# Patient Record
Sex: FEMALE | Race: WHITE | Employment: FULL TIME | URBAN - METROPOLITAN AREA
[De-identification: names, ages, dates, MRNs, and addresses within clinical notes are randomized per-mention and may not be internally consistent; named-entity substitution may affect disease eponyms.]

---

## 2020-10-24 ENCOUNTER — APPOINTMENT (OUTPATIENT)
Dept: MRI IMAGING | Facility: HOSPITAL | Age: 40
DRG: 776 | End: 2020-10-24
Attending: EMERGENCY MEDICINE
Payer: COMMERCIAL

## 2020-10-24 ENCOUNTER — HOSPITAL ENCOUNTER (INPATIENT)
Facility: HOSPITAL | Age: 40
LOS: 2 days | Discharge: HOME | DRG: 776 | End: 2020-10-27
Attending: EMERGENCY MEDICINE | Admitting: HOSPITALIST
Payer: COMMERCIAL

## 2020-10-24 DIAGNOSIS — I60.9 SAH (SUBARACHNOID HEMORRHAGE) (HCC): ICD-10-CM

## 2020-10-24 DIAGNOSIS — I63.89: Primary | ICD-10-CM

## 2020-10-24 PROCEDURE — 70552 MRI BRAIN STEM W/DYE: CPT | Performed by: EMERGENCY MEDICINE

## 2020-10-24 PROCEDURE — 70553 MRI BRAIN STEM W/O & W/DYE: CPT | Performed by: EMERGENCY MEDICINE

## 2020-10-24 PROCEDURE — 70551 MRI BRAIN STEM W/O DYE: CPT | Performed by: EMERGENCY MEDICINE

## 2020-10-24 NOTE — ED INITIAL ASSESSMENT (HPI)
Pt had left arm numbness and hour ago. It subsided then moved to hand. Pt is symptom free right now. Pt notes that she becomes anxious for BP. Pt noted that her daughter  in the room next door last year.

## 2020-10-24 NOTE — ED PROVIDER NOTES
Patient Seen in: [facility] Emergency Department      History   Patient presents with:  Numbness Weakness    Stated Complaint: pt had left sidded numbness that has subsided    HPI    62-year-old female presents reporting an episode of tingling to th 172.7 cm (5' 8\")   Wt 77.1 kg   SpO2 98%   BMI 25.85 kg/m²         Physical Exam    General:  Vitals as listed. No acute distress   HEENT: Sclerae anicteric. Conjunctivae show no pallor.   Oropharynx clear, mucous membranes moist   Neck: supple, no rigid COMPARISON:  None. INDICATIONS:  pt had left sided numbness that has subsided  TECHNIQUE:  MRI of the brain was performed with multi-planar T1, T2-weighted images with FLAIR sequences and diffusion weighted images without infusion.   PATIENT STATED HISTORY Dictated by (CST): Christiana Wetzel MD on 10/24/2020 at 5:11 PM     Finalized by (CST): Christiana Wetzel MD on 10/24/2020 at 5:47 PM         Critical care time:  A total of 39 minutes of critical care time (exclusive of billable procedures) was adminis pm    Follow-up:  No follow-up provider specified.         Medications Prescribed:  Current Discharge Medication List                       Present on Admission           ICD-10-CM Noted POA    Cerebral venous infarction, acute ([address] I63.89 10/24/2020 Mayrao

## 2020-10-25 ENCOUNTER — APPOINTMENT (OUTPATIENT)
Dept: MRI IMAGING | Facility: HOSPITAL | Age: 40
DRG: 776 | End: 2020-10-25
Attending: RADIOLOGY
Payer: COMMERCIAL

## 2020-10-25 ENCOUNTER — APPOINTMENT (OUTPATIENT)
Dept: CT IMAGING | Facility: HOSPITAL | Age: 40
DRG: 776 | End: 2020-10-25
Attending: NURSE PRACTITIONER
Payer: COMMERCIAL

## 2020-10-25 PROBLEM — I60.9 SAH (SUBARACHNOID HEMORRHAGE) (HCC): Status: ACTIVE | Noted: 2020-10-25

## 2020-10-25 PROBLEM — I63.89: Status: RESOLVED | Noted: 2020-10-24 | Resolved: 2020-10-25

## 2020-10-25 PROCEDURE — 99291 CRITICAL CARE FIRST HOUR: CPT | Performed by: INTERNAL MEDICINE

## 2020-10-25 PROCEDURE — 70553 MRI BRAIN STEM W/O & W/DYE: CPT | Performed by: RADIOLOGY

## 2020-10-25 PROCEDURE — 70496 CT ANGIOGRAPHY HEAD: CPT | Performed by: NURSE PRACTITIONER

## 2020-10-25 PROCEDURE — 70450 CT HEAD/BRAIN W/O DYE: CPT | Performed by: NURSE PRACTITIONER

## 2020-10-25 PROCEDURE — 70546 MR ANGIOGRAPH HEAD W/O&W/DYE: CPT | Performed by: RADIOLOGY

## 2020-10-25 PROCEDURE — 99292 CRITICAL CARE ADDL 30 MIN: CPT | Performed by: INTERNAL MEDICINE

## 2020-10-25 PROCEDURE — 99223 1ST HOSP IP/OBS HIGH 75: CPT | Performed by: HOSPITALIST

## 2020-10-25 PROCEDURE — 99221 1ST HOSP IP/OBS SF/LOW 40: CPT | Performed by: NEUROLOGICAL SURGERY

## 2020-10-25 PROCEDURE — 70544 MR ANGIOGRAPHY HEAD W/O DYE: CPT | Performed by: RADIOLOGY

## 2020-10-25 RX ORDER — ACETAMINOPHEN 650 MG/1
650 SUPPOSITORY RECTAL EVERY 4 HOURS PRN
Status: DISCONTINUED | OUTPATIENT
Start: 2020-10-25 | End: 2020-10-27

## 2020-10-25 RX ORDER — POTASSIUM CHLORIDE 20 MEQ/1
40 TABLET, EXTENDED RELEASE ORAL EVERY 4 HOURS
Status: COMPLETED | OUTPATIENT
Start: 2020-10-25 | End: 2020-10-25

## 2020-10-25 RX ORDER — DOCUSATE SODIUM 100 MG/1
100 CAPSULE, LIQUID FILLED ORAL 2 TIMES DAILY PRN
Status: DISCONTINUED | OUTPATIENT
Start: 2020-10-25 | End: 2020-10-27

## 2020-10-25 RX ORDER — HYDRALAZINE HYDROCHLORIDE 20 MG/ML
10 INJECTION INTRAMUSCULAR; INTRAVENOUS EVERY 2 HOUR PRN
Status: DISCONTINUED | OUTPATIENT
Start: 2020-10-25 | End: 2020-10-27

## 2020-10-25 RX ORDER — ONDANSETRON 2 MG/ML
4 INJECTION INTRAMUSCULAR; INTRAVENOUS EVERY 6 HOURS PRN
Status: DISCONTINUED | OUTPATIENT
Start: 2020-10-25 | End: 2020-10-27

## 2020-10-25 RX ORDER — ACETAMINOPHEN 325 MG/1
650 TABLET ORAL EVERY 4 HOURS PRN
Status: DISCONTINUED | OUTPATIENT
Start: 2020-10-25 | End: 2020-10-27

## 2020-10-25 RX ORDER — PHENYLEPHRINE HCL IN 0.9% NACL 50MG/250ML
PLASTIC BAG, INJECTION (ML) INTRAVENOUS CONTINUOUS PRN
Status: DISCONTINUED | OUTPATIENT
Start: 2020-10-25 | End: 2020-10-27

## 2020-10-25 RX ORDER — LABETALOL HYDROCHLORIDE 5 MG/ML
10 INJECTION, SOLUTION INTRAVENOUS EVERY 10 MIN PRN
Status: DISCONTINUED | OUTPATIENT
Start: 2020-10-25 | End: 2020-10-27

## 2020-10-25 RX ORDER — METOCLOPRAMIDE HYDROCHLORIDE 5 MG/ML
10 INJECTION INTRAMUSCULAR; INTRAVENOUS EVERY 8 HOURS PRN
Status: DISCONTINUED | OUTPATIENT
Start: 2020-10-25 | End: 2020-10-27

## 2020-10-25 RX ORDER — SODIUM CHLORIDE 9 MG/ML
INJECTION, SOLUTION INTRAVENOUS CONTINUOUS
Status: DISCONTINUED | OUTPATIENT
Start: 2020-10-25 | End: 2020-10-26

## 2020-10-25 RX ORDER — MORPHINE SULFATE 2 MG/ML
2 INJECTION, SOLUTION INTRAMUSCULAR; INTRAVENOUS EVERY 2 HOUR PRN
Status: DISCONTINUED | OUTPATIENT
Start: 2020-10-25 | End: 2020-10-27

## 2020-10-25 RX ORDER — LORAZEPAM 2 MG/ML
1 INJECTION INTRAMUSCULAR
Status: DISCONTINUED | OUTPATIENT
Start: 2020-10-25 | End: 2020-10-27

## 2020-10-25 RX ORDER — MORPHINE SULFATE 2 MG/ML
1 INJECTION, SOLUTION INTRAMUSCULAR; INTRAVENOUS EVERY 2 HOUR PRN
Status: DISCONTINUED | OUTPATIENT
Start: 2020-10-25 | End: 2020-10-27

## 2020-10-25 NOTE — H&P
[facility]  History and Physical     Jesse Óscar Patient Status:  Inpatient    1980 MRN [id number]   [facility] 6NE-A Attending Merline Hartshorn [address] Day # 0 PCP None Pcp     Chief Complaint: left sided numbness daily, Disp: 60, Rfl: 6        Review of Systems:   A comprehensive 14 point review of systems was completed. Pertinent positives and negatives noted in the HPI.     Physical Exam:    /78   Pulse 62   Temp 98.2 °F (36.8 °C) (Temporal)   Resp 15   H Possible UTI with 11-20 WBCs and moderate leukocyte esterase. We will continue IV antibiotics with urine culture pending.   Quality:  · DVT Prophylaxis: SCD's  · CODE status: Full  · Begum: N/A  · If COVID testing is negative, may discontinue isolation: Lakisha Layla

## 2020-10-25 NOTE — PHYSICAL THERAPY NOTE
Physical therapy consult ordered via stroke protocol, but pt remains on bedrest at this time. Will initiate evaluation as activity restrictions are lifted.

## 2020-10-25 NOTE — PROGRESS NOTES
[facility]  Neurocritical Care APRN Progress Note    NAME: Dulce Maria Edmonds - ROOM: 4138 - MRN: [id number] - Age: 47year old - [facility]:[birth year]    History Of Present Illness:  Dulce Maria Edmonds is a 47year old female who has a vaginal deliver Extremities normal, atraumatic, no cyanosis or edema, capillary refill <3 sec. Pulses: 2+ and symmetric all extremities  Skin: Skin color, texture, turgor normal for ethnicity, no rashes or lesions, warm and dry  Neurologic:  This patient is alert and or CO2 29.0 10/24/2020    GLU 88 10/24/2020    CA 8.9 10/24/2020    ALB 3.6 10/24/2020    ALKPHO 129 10/24/2020    BILT 0.3 10/24/2020    TP 7.8 10/24/2020    AST 18 10/24/2020    ALT 24 10/24/2020       Assessment/Plan:  1.  ICH, SAH - right frontal pariatal

## 2020-10-25 NOTE — PLAN OF CARE
Patient care assumed [id number] from ED. Neurologically intact. Fluids running. NPO sips with meds. Slight headache managed with tylenol. Plan for scan in am and possibly cerebral angio.

## 2020-10-25 NOTE — ED NOTES
Talked with radiologist dr Darlene Guzman. Confirmed MRI results included MRI with and without contrast. APN aware.  Dr Travis Asif made aware of result included both MRI with and without contrast.

## 2020-10-25 NOTE — CONSULTS
[facility]  Interventional Neuroradiology  Consultation Note    Stephanie Daily Patient Status:  Inpatient    1980 MRN [id number]   [facility] 6NE-A Attending Luis Rolon [address] Day # 0 PCP None Pcp     REASON FOR CON she does not use drugs. ALLERGIES:  No Known Allergies    MEDICATIONS:  Prior to Admission medications :  Medication PRENATAL VITAMIN OR, Sig daily with dinner, Start Date , End Date , Taking?  Yes, Authorizing Provider External/Patient, Reported    Medi distress. HEENT:  Normocephalic, atraumatic  LUNGS: Clear to auscultation bilaterally. HEART:  S1, S2, Regular rate and rhythm. ABD: Soft, non tender  Pedal pulses 1+ bilaterally     NEUROLOGICAL:    Mental status: Alert.  oriented to person, place and extra-axial blood measuring 3.8 cm in AP dimension, 0.8 cm craniocaudad dimension by 1 cm transverse dimension over the high convexity of the posterior right frontal anterior right parietal lobe.   There is some high   T1 and FLAIR signal within the sulci o

## 2020-10-25 NOTE — SLP NOTE
B/S swallow eval ordered per stroke protocol. Pt currently NPO for cerebral angio. RN reported pt tolerated meds with small sips water with no csa. SLP will attempt later today as schedule permits. Lala Moncada M.S.,CCC-SLP  Speech Language Patho

## 2020-10-25 NOTE — CONSULTS
[facility]  Neurocritical Care Consult Note    Curt Carranza Patient Status:  Inpatient    1980 MRN [id number]   [facility] 6NE-A Attending Micaela Chapman, DO   Hosp Day # 0 PCP None Pcp       Reason for Consultatio mg, Oral, BID PRN    •  ondansetron HCl (ZOFRAN) injection 4 mg, 4 mg, Intravenous, Q6H PRN    Or    •  Metoclopramide HCl (REGLAN) injection 10 mg, 10 mg, Intravenous, Q8H PRN    •  LORazepam (ATIVAN) injection 1 mg, 1 mg, Intravenous, Q15 Min PRN    •  m round and reactive to light, extraocular muscles intact, face symmetric, visual fields full  · Motor- 5/5 throughout  · Sens-  Intact to light touch    Diagnostics:   Mri Brain (cpt=70551)    Result Date: 10/24/2020  CONCLUSION:  There is a collection of e includes cerebral venous thrombosis (given postpartum state) resulting in venous infarct and hemorrhage vs vascular malformation vs underlying mass/infection vs trauma.  Will cont neurochecks/pt/ot, consult PONCHO and NS, will check MRV today and plan for gaurav

## 2020-10-25 NOTE — CONSULTS
[facility]  Neurosurgery Consult  10/25/2020    Divina Guevara Patient Status:  Inpatient    1980 MRN [id number]   [facility] 6NE-A Attending Angel Perez DO   Hosp Day # 0 PCP None Pcp     REASON FOR CONSULT:    Sah/possible ve delineate the cause of her blood  She is at her neurologic baseline  We will follow    Nicholas Wright MD   [address]  10/25/2020  10:19 AM

## 2020-10-25 NOTE — PLAN OF CARE
Problem: NEUROLOGICAL - ADULT  Goal: Achieves stable or improved neurological status  Description: INTERVENTIONS  - Assess for and report changes in neurological status  - Initiate measures to prevent increased intracranial pressure  - Maintain blood pre

## 2020-10-26 ENCOUNTER — APPOINTMENT (OUTPATIENT)
Dept: INTERVENTIONAL RADIOLOGY/VASCULAR | Facility: HOSPITAL | Age: 40
DRG: 776 | End: 2020-10-26
Attending: RADIOLOGY
Payer: COMMERCIAL

## 2020-10-26 ENCOUNTER — APPOINTMENT (OUTPATIENT)
Dept: CT IMAGING | Facility: HOSPITAL | Age: 40
DRG: 776 | End: 2020-10-26
Attending: NURSE PRACTITIONER
Payer: COMMERCIAL

## 2020-10-26 ENCOUNTER — NURSE ONLY (OUTPATIENT)
Dept: ELECTROPHYSIOLOGY | Facility: HOSPITAL | Age: 40
DRG: 776 | End: 2020-10-26
Attending: INTERNAL MEDICINE
Payer: COMMERCIAL

## 2020-10-26 PROCEDURE — 99231 SBSQ HOSP IP/OBS SF/LOW 25: CPT | Performed by: NEUROLOGICAL SURGERY

## 2020-10-26 PROCEDURE — 99232 SBSQ HOSP IP/OBS MODERATE 35: CPT | Performed by: HOSPITALIST

## 2020-10-26 PROCEDURE — 99291 CRITICAL CARE FIRST HOUR: CPT | Performed by: INTERNAL MEDICINE

## 2020-10-26 PROCEDURE — 95812 EEG 41-60 MINUTES: CPT | Performed by: OTHER

## 2020-10-26 PROCEDURE — 70450 CT HEAD/BRAIN W/O DYE: CPT | Performed by: NURSE PRACTITIONER

## 2020-10-26 PROCEDURE — B31QYZZ FLUOROSCOPY OF CERVICO-CEREBRAL ARCH USING OTHER CONTRAST: ICD-10-PCS | Performed by: RADIOLOGY

## 2020-10-26 PROCEDURE — 3008F BODY MASS INDEX DOCD: CPT | Performed by: INTERNAL MEDICINE

## 2020-10-26 PROCEDURE — 3075F SYST BP GE 130 - 139MM HG: CPT | Performed by: INTERNAL MEDICINE

## 2020-10-26 PROCEDURE — 3079F DIAST BP 80-89 MM HG: CPT | Performed by: INTERNAL MEDICINE

## 2020-10-26 RX ORDER — LIDOCAINE AND PRILOCAINE 25; 25 MG/G; MG/G
CREAM TOPICAL ONCE AS NEEDED
Status: COMPLETED | OUTPATIENT
Start: 2020-10-26 | End: 2020-10-26

## 2020-10-26 RX ORDER — VERAPAMIL HYDROCHLORIDE 2.5 MG/ML
INJECTION, SOLUTION INTRAVENOUS
Status: COMPLETED
Start: 2020-10-26 | End: 2020-10-26

## 2020-10-26 RX ORDER — LIDOCAINE HYDROCHLORIDE 10 MG/ML
INJECTION, SOLUTION INFILTRATION; PERINEURAL
Status: COMPLETED
Start: 2020-10-26 | End: 2020-10-26

## 2020-10-26 RX ORDER — CAFFEINE CITRATE 20 MG/ML
SOLUTION ORAL
Status: COMPLETED
Start: 2020-10-26 | End: 2020-10-26

## 2020-10-26 RX ORDER — NITROGLYCERIN 20 MG/100ML
INJECTION INTRAVENOUS
Status: COMPLETED
Start: 2020-10-26 | End: 2020-10-26

## 2020-10-26 RX ORDER — MIDAZOLAM HYDROCHLORIDE 1 MG/ML
INJECTION INTRAMUSCULAR; INTRAVENOUS
Status: COMPLETED
Start: 2020-10-26 | End: 2020-10-26

## 2020-10-26 RX ORDER — HEPARIN SODIUM 5000 [USP'U]/ML
INJECTION, SOLUTION INTRAVENOUS; SUBCUTANEOUS
Status: COMPLETED
Start: 2020-10-26 | End: 2020-10-26

## 2020-10-26 NOTE — PAYOR COMM NOTE
--------------  Appropriate for inpatient status per guidelines for tingling due to [address].         ADMISSION REVIEW     Payor: [address] #:  [id number]  Authorization Number: [id number]       ED Provider Notes        Zeina 143/96   Pulse 91   Temp 97.2 °F (36.2 °C) (Tympanic)   Resp 16   Ht 172.7 cm (5' 8\")   Wt 77.1 kg   SpO2 98%   BMI 25.85 kg/m²         Physical Exam    General:  Vitals as listed. No acute distress   HEENT: Sclerae anicteric.   Conjunctivae show no pallo (cpt=70551)    Result Date: 10/24/2020  PROCEDURE:  MRI BRAIN (CPT=70551)  COMPARISON:  None.   INDICATIONS:  pt had left sided numbness that has subsided  TECHNIQUE:  MRI of the brain was performed with multi-planar T1, T2-weighted images with FLAIR sequen to Dr. Israel Asencio at [phone] hours on 10/24/20. Result read back was performed. Dictated by (CST): Gunnar Sunshine MD on 10/24/2020 at 5:11 PM      MDM      33-year-old female presents after an episode of tingling to the left side of her body.    also bit of a headache and so she called 911 her left arm into her jaw and had some facial numbness and tingling on the left side patient states that when she was having the numbness on the left side of the face that the numbness and tingling in the hands had i CA 8.9   ALB 3.6      K 3.3*      CO2 29.0   ALKPHO 129*   AST 18   ALT 24   BILT 0.3   TP 7.8       ASSESSMENT / PLAN:     1. Intracranial hemorrhage, initial MRI in the posterior right frontal anterior parietal lobe.   Will admit to neuro IC found to have a small SAH - thus far imaging including CTV, MRV, MRA unremarkable     Suspected UTI - urine culture no growth after 2 days     Plan:  Plan for cerebral angiogram, likely this afternoon  RN to verify consent  Emla cream to right wrist for po noted. 3) Abnormalities:  None  4) Activation:                    HV: Not performed. IPS: Not performed.     IMPRESSION:     This EEG is a normal study recorded in the wake states.  No focal, lateralizing, or epileptiform activity is see

## 2020-10-26 NOTE — PLAN OF CARE
Assumed care at 1, down in MRI. Resulted eventually which is negative for any AVM or aneurysm. She is neurologically intact- alert/oriented x 4. Denies visual disturbance but with mild to moderate headache. Following commands, cueva x 4.  NSR; SBP at go

## 2020-10-26 NOTE — PROGRESS NOTES
[facility]  Interventional Neuroradiology Progress Note    Avery Burnham Patient Status:  Inpatient    1980 MRN [id number]   [facility] 6NE-A Attending Manan Dowd, [address] [facility] Day # 1 PCP None Pcp     Subjective:  Avery Burnham CA 8.8 10/26/2020    INR 1.02 10/26/2020    PTP 13.7 10/26/2020    PGLU 95 10/25/2020     Imaging:  [facility] 10/26/2020  No significant change when compared to most recent CT of the brain performed 10/25/2020.   Please refer to dedicated MRI of the brain with an Also in the differential would   include inflammatory change/infection/arterial infarct. Post contrast MRI images are recommended for further evaluation.       Assessment:  47year old female 21 days post partum, presenting with left sided numbness found

## 2020-10-26 NOTE — PROCEDURES
[facility]  Pre-Procedure Note  Yassine Hernandez Patient Status:  Inpatient    1980 MRN [id number]   [facility] 6NE-A Attending Yoal Noyola, DO   Hosp Day # 1 PCP None Pcp     Pre-Op Diagnosis:  Non-traumatic SAH/SDH, in a patient

## 2020-10-26 NOTE — PLAN OF CARE
Assumed care of the pt at 0730, neuro intact; no complaints of numbness or tingling to Left side of body. VSS, afebrile, see flowsheet for further assessment.  Cerebral angio complete with MD, see notes, consent in chart, spoke to MD prior to procedure, sta

## 2020-10-26 NOTE — PROGRESS NOTES
Physician assistant note reviewed  Agree with assessment and plan as stated   Case discussed with Physician Assistant [facility]  Neurocritical Care Progress Note     Connie Black Patient Status:  Inpatient    1980 MRN [id number]   [facility] 6NE-A Attending Geena Luu DO   Hosp Day # 1 PCP None Pcp     Subjective:   Patient premix infusion, 5-15 mg/hr, Intravenous, Continuous PRN    •  phenylephrine in NaCl (BERNABE-SYNEPHRINE) 50 mg/250 ml premix infusion SOLN, 100-200 mcg/min, Intravenous, Continuous PRN    •  cefTRIAXone Sodium (ROCEPHIN) 1 g in sodium chloride 0.9% 100 mL MBP frontoparietal region that may be reactive. No significant parenchymal edema identified. 2. No evidence of intracranial aneurysm, flow-limiting stenosis, or focal arterial occlusion.    Dictated by (CST): Li Ricci MD on 10/25/2020 at 8:02 PM     Fi [address]

## 2020-10-26 NOTE — PROGRESS NOTES
[facility]  Neurosurgery Progress Note    Kay Guevara Patient Status:  Inpatient    1980 MRN [id number]   [facility] 6NE-A Attending Rowena Ferrell, [address] [facility] [address] Day # 1 PCP None Pcp     Chief Complaint:  Right parietal SAh    Subj MASTOIDS:          No sign of acute inflammation. SKULL:             Stable.                   =====  CONCLUSION:    1. No significant change when compared to most recent CT of the brain performed 10/25/2020.   Please refer to dedicated MRI of the brain

## 2020-10-26 NOTE — SLP NOTE
Note patient NPO for cerebral angiogram later today. Unable to complete swallowing evaluation. Will hold and follow up once cleared for po. Note patient passed RN dysphagia screen.       Amy Ackerman Britton 87 CCC-SLP  Pager [phone]

## 2020-10-26 NOTE — PROGRESS NOTES
[facility]  Progress Note     Rodo No Patient Status:  Inpatient    1980 MRN [id number]   [facility] 6NE-A Attending Rowena Ferrell, [address] [facility] Day # 1 PCP None Pcp     Chief Complaint: ICH    S: Patient reports episode of the last 168 hours. Imaging: Imaging data reviewed in Epic. Medications:   • potassium chloride 40mEq IVPB (peripheral line)  40 mEq Intravenous Once       ASSESSMENT / PLAN:     1. R frontal/parietal SAH  1.  ICH protocol, Neuro checks  2. pendi

## 2020-10-26 NOTE — PROCEDURES
[facility]  Neurointerventional Surgery Operative Report  Rodo No Patient Status:  Inpatient    1980 MRN [id number]   [facility] 6NE-A Attending Rowena Ferrell DO   Hosp Day # 1 PCP None Pcp     Procedure: cervicocerebral a

## 2020-10-26 NOTE — OCCUPATIONAL THERAPY NOTE
Received order for OT evaluation. Chart reviewed. Cerebral angiogram scheduled today. Bedrest.  OT will initiate therapy once the activity level is upgraded. Spoke with RN.

## 2020-10-27 ENCOUNTER — TELEPHONE (OUTPATIENT)
Dept: SURGERY | Facility: CLINIC | Age: 40
End: 2020-10-27

## 2020-10-27 VITALS
HEIGHT: 68 IN | DIASTOLIC BLOOD PRESSURE: 83 MMHG | BODY MASS INDEX: 26.03 KG/M2 | WEIGHT: 171.75 LBS | HEART RATE: 76 BPM | SYSTOLIC BLOOD PRESSURE: 131 MMHG | OXYGEN SATURATION: 98 % | TEMPERATURE: 98 F | RESPIRATION RATE: 16 BRPM

## 2020-10-27 DIAGNOSIS — I60.7: ICD-10-CM

## 2020-10-27 DIAGNOSIS — Z01.818 PRE-OP TESTING: Primary | ICD-10-CM

## 2020-10-27 DIAGNOSIS — I60.9 SAH (SUBARACHNOID HEMORRHAGE) (HCC): ICD-10-CM

## 2020-10-27 PROCEDURE — 99231 SBSQ HOSP IP/OBS SF/LOW 25: CPT | Performed by: NEUROLOGICAL SURGERY

## 2020-10-27 PROCEDURE — 99291 CRITICAL CARE FIRST HOUR: CPT | Performed by: INTERNAL MEDICINE

## 2020-10-27 PROCEDURE — 99239 HOSP IP/OBS DSCHRG MGMT >30: CPT | Performed by: HOSPITALIST

## 2020-10-27 RX ORDER — POTASSIUM CHLORIDE 20 MEQ/1
40 TABLET, EXTENDED RELEASE ORAL ONCE
Status: DISCONTINUED | OUTPATIENT
Start: 2020-10-27 | End: 2020-10-27

## 2020-10-27 NOTE — PLAN OF CARE
Assumed care of patient at 0730 patient alert X 4. Neuro exam intact. MCCAIN w/ equal strength. Denies numbness/tingling at this time no episodes today. NSR on monitor. SBP normotensive. Lungs clear. Mild headache behind left eye. PRN tylenol for pain.  Bessie Encourage visually impaired, hearing impaired and aphasic patients to use assistive/communication devices  Outcome: Adequate for Discharge     Problem: Impaired Functional Mobility  Goal: Achieve highest/safest level of mobility/gait  Description: Interven

## 2020-10-27 NOTE — TELEPHONE ENCOUNTER
Spke with LUCA Rueda who stated patient will not need to repeat bloodwork. Patient will need to complete covid test prior to.     Will call patient on 10/28/20 to schedule

## 2020-10-27 NOTE — PROGRESS NOTES
[facility]  Neurosurgery Progress Note    Greta Guevara Patient Status:  Inpatient    1980 MRN [id number]   [facility] 6NE-A Attending Aletha Meza, [address] [facility] Day # 2 PCP None Pcp     Chief Complaint:  R parietal [address]    Subjecti +WO)(CPT=70496), 10/25/2020, 0:04 AM.  [facility] , MR, MRI BRAIN (W+WO) (CPT=70553), 10/24/2020, 4:41 PM.  [facility] , MR, MRI BRAIN(W+WO)/MRA   BRAIN (CPT=70553/32722), 10/25/2020, 6:40 PM.  [facility] , CT, CT BRAIN OR HEAD (35679), 10/25/2020, 9:55 AM.     Jayda Haro was requested to assess for epileptiform activity and change in mental status.      State(s) of consciousness: Awake     Relevant medications:      FINDINGS:  1) Background: A  posterior-dominant background rhythm of 8-10 Hz with an amplitude of 30-60 micr

## 2020-10-27 NOTE — PHYSICAL THERAPY NOTE
PHYSICAL THERAPY QUICK EVALUATION - INPATIENT    Room Number: 0363/4990-G  Evaluation Date: 10/27/2020  Presenting Problem: [facility]  Physician Order: PT Eval and Treat     Pt is 47year old female admitted on 10/24/2020 from home with c/o L sided numbness th MOBILITY  How much difficulty does the patient currently have. ..  -   Turning over in bed (including adjusting bedclothes, sheets and blankets)?: None   -   Sitting down on and standing up from a chair with arms (e.g., wheelchair, bedside commode, etc.): N change walking speed without loss of balance or gait deviation. Shows a significant difference in walking speed between normal, fast and slow speeds.    (2)  Mild Impairment: Is able to change speed but demonstrates mild gait deviations, or no gait deviatio ambulating in the hallway. Discussed stress management, body mechanics, and activity recommendations to reduce L neck/shoulder tension. Pt was provided manual therapy to L trap x5 minutes. HEP program demonstrated and handout provided.   HEP to be comp level surfaces Safely and independently

## 2020-10-27 NOTE — TELEPHONE ENCOUNTER
Please have patient scheduled for diagnostic cerebral angiogram in 2 weeks. She presented with SAH/SDH on 10/25 and underwent initial cerebral angiogram on 10/26 with Dr. Shey Rivera.       Patient is planning for discharge to home today but will need to

## 2020-10-27 NOTE — PROGRESS NOTES
[facility]  Interventional Neuroradiology Progress Note    Ariadne Patricio Patient Status:  Inpatient    1980 MRN [id number]   [facility] 6NE-A Attending Vic Best, [address] [facility] Day # 2 PCP None Pcp     Subjective:  Ariadne Patricio Imaging:  No new imaging     Assessment:  47year old female with acute onset left sided numbness tingling, found to have SAH/SDH, unremarkable cerebral angiogram for aneurysm, AV shunt or vasculopathy    Plan: Will discuss with Dr. Dutch Arguelles.  No fur

## 2020-10-27 NOTE — SLP NOTE
ADULT SWALLOWING EVALUATION    ASSESSMENT    ASSESSMENT/OVERALL IMPRESSION:  Patient seen for swallowing evaluation per protocol. Patient alert and up in bed with  present and supportive.   Patient admitted due to left sided arm numbness and brief l revealed:  CONCLUSION:       1. Redemonstration of subacute subdural hematoma along the right frontal parietal convexity measuring up to 8 mm in thickness. There is also stable subacute subarachnoid hemorrhage in some of the right frontoparietal sulci.   Demetrius Maruicio

## 2020-10-27 NOTE — PLAN OF CARE
Problem: Impaired Activities of Daily Living  Goal: Achieve highest/safest level of independence in self care  Description: Interventions:  - Assess ability and encourage patient to participate in ADLs to maximize function  - Promote sitting position [facility]

## 2020-10-27 NOTE — PLAN OF CARE
Assumed care of Jesús Lawrence at [address] w/ patient neurologically stable. Alert/oriented x4. Mild headache 2-4/10 improved with Tylenol & Morphine. Following commands, cueva x 4 with equal strength.  R radial puncture site from angiogram stable- soft & no bleeding o

## 2020-10-27 NOTE — PROCEDURES
[facility] - ELECTROENCEPHALOGRAM (EEG) REPORT  Patient Name:  Cash Lux   MRN / CSN:  [id number] / [id number]   Date of Birth / Age:  9/2/1980 /  47year old   Encounter Date:  10/26/20         METHODS:  Twenty-two electrodes were applied according to the 10

## 2020-10-27 NOTE — PLAN OF CARE
Problem: Impaired Functional Mobility  Goal: Achieve highest/safest level of mobility/gait  Description: Interventions:  - Assess patient's functional ability and stability  - Promote increasing activity/tolerance for mobility and gait  - Educate and engag

## 2020-10-27 NOTE — DISCHARGE SUMMARY
[facility]  DISCHARGE SUMMARY     Tasha Guevara Patient Status:  Inpatient    1980 MRN [id number]   [facility] 6NE-A Attending Rosendo Purdy, DO   Hosp Day # 2 PCP None Pcp     Date of Admission: 10/24/2020  Date of Discharge: cerebral angio- no evidence of aneurysm, AV shunt or vasculopathy. She was evaluated by PT, OT, ST.  Patient tolerating activity. No episodes of numbness/tingling today. Recommended repeat lipids as outpatient.   PONCHO plans for another angiogram in 2 week week      [address] [facility]  [address] [address]  [phone]  In 3 days      Aman Duval MD  [address] [phone]      Planning for outpatient cerebral angiogram o

## 2020-10-27 NOTE — TELEPHONE ENCOUNTER
Spoke with LUCA Motne. Patient is currently inpatient, but being discharged. Angiogram date currently being held for 11/9/2020 at 8 am with . Patient will need to be called on 10/28/2020 to go over procedure instructions.     Will a

## 2020-10-27 NOTE — PROGRESS NOTES
[facility]  Neurocritical Care Progress Note     Ryan Laws Patient Status:  Inpatient    1980 MRN [id number]   [facility] 6NE-A Attending Lazarus Handy, [address] [facility] Day # 2 PCP None Pcp     Subjective:   Patient mg/250 ml premix infusion SOLN, 100-200 mcg/min, Intravenous, Continuous PRN      Physical Examination:   General- No acute distress  CV- RRR  Resp- CTA Bilat  Neuro-  · Mental status- awake and alert, regards and follows commands, oriented x3, speech flue Director of System Neurosciences  Chief, Section of [address]

## 2020-10-27 NOTE — OCCUPATIONAL THERAPY NOTE
OCCUPATIONAL THERAPY QUICK EVALUATION - INPATIENT    Room Number: 3656/7685-Y  Evaluation Date: 10/27/2020     Type of Evaluation: Quick Eval  Presenting Problem: non tarumatic SAH/SDH    Physician Order: IP Consult to Occupational Therapy  Reason for Ther BEARING RESTRICTION  Weight Bearing Restriction: None                PAIN ASSESSMENT  Ratin          COGNITION  intact  RANGE OF MOTION AND STRENGTH ASSESSMENT  Upper extremity ROM is within functional limits     Upper extremity strength is within func addressed; Family present    ASSESSMENT     Patient is a 47year old female admitted with L sided numbness. 10/24 CTV venogram showed subtle SAH/SDH. Complete medical history and occupational profile noted above.  Functional outcome measures completed inc

## 2020-10-28 ENCOUNTER — PATIENT OUTREACH (OUTPATIENT)
Dept: CASE MANAGEMENT | Age: 40
End: 2020-10-28

## 2020-10-28 NOTE — PROGRESS NOTES
Initial Post Discharge Follow Up   Discharge Date: 10/27/20  Contact Date: 10/28/2020    Consent Verification:  Assessment Completed With: Patient  HIPAA Verified? Yes    Discharge Dx:     1. Spontaneous R frontal/parietal SAH, stable  2.  HA 2/2 above diet no issues.     Medications:   Current Outpatient Medications   Medication Sig Dispense Refill   • LEVOXYL 112 MCG OR TABS take 1 tablet daily 60 6   • PRENATAL VITAMIN OR daily with dinner       • (NCM)  Were there any medication changes noted on AVS? Roxanne Yen with any questions or needs, she states she will.     CCM referral placed:  Not Applicable    BOOK BY DATE: 11/10/2020    [x]  Discharge Summary, Discharge medications reviewed/discussed/and reconciled against outpatient medications with patient,

## 2020-10-28 NOTE — TELEPHONE ENCOUNTER
Patient scheduled for cerebral angiogram on 11/9/2020 at 8 am with Dr. Melanie Canas.     Discussed Angiogram instructions with patient:    · Arrive one hour prior to scheduled procedure start time and get registered at the [place], it is located at the [facility]

## 2020-10-28 NOTE — PAYOR COMM NOTE
--------------  DISCHARGE REVIEW    Payor: [address] #:  [id number]  Authorization Number: [id number]    Admit date: 10/25/20  Admit time:  0036  Discharge Date: 10/27/2020  1:00 PM     Admitting Physician: Connie Simpson syncope.       Brief Synopsis: Patient is a 29-year-old female who presented with left-sided numbness and tingling. She had recent vaginal delivery 3 weeks ago. CT/MRI brain imaging showed right frontoparietal lobe hemorrhage.   She was admitted to the ne 60  Refills: 6     PRENATAL VITAMIN OR  Notes to patient: Resume as normal      daily with dinner   Refills: 0            [place]    Follow-up appointment:   [facility]  [address]  [facility]  [address]

## 2020-10-29 NOTE — TELEPHONE ENCOUNTER
Prior authorization request completed for: Angiogram -Outpatient   Authorization #Notification or Prior Authorization is not required for the requested services    This [employer] plan does not currently require a prior authorizat

## 2020-11-06 ENCOUNTER — APPOINTMENT (OUTPATIENT)
Dept: LAB | Age: 40
End: 2020-11-06
Attending: NURSE PRACTITIONER
Payer: COMMERCIAL

## 2020-11-06 DIAGNOSIS — I60.9 SUBARACHNOID HEMORRHAGE (HCC): ICD-10-CM

## 2020-11-09 ENCOUNTER — HOSPITAL ENCOUNTER (OUTPATIENT)
Dept: INTERVENTIONAL RADIOLOGY/VASCULAR | Facility: HOSPITAL | Age: 40
Discharge: HOME | End: 2020-11-09
Attending: RADIOLOGY | Admitting: RADIOLOGY
Payer: COMMERCIAL

## 2020-11-09 VITALS
OXYGEN SATURATION: 99 % | HEIGHT: 68 IN | WEIGHT: 170 LBS | BODY MASS INDEX: 25.76 KG/M2 | SYSTOLIC BLOOD PRESSURE: 150 MMHG | HEART RATE: 93 BPM | DIASTOLIC BLOOD PRESSURE: 77 MMHG | RESPIRATION RATE: 13 BRPM

## 2020-11-09 DIAGNOSIS — I60.7: ICD-10-CM

## 2020-11-09 DIAGNOSIS — Z01.818 PRE-OP TESTING: ICD-10-CM

## 2020-11-09 DIAGNOSIS — I60.9 SUBARACHNOID HEMORRHAGE (HCC): Primary | ICD-10-CM

## 2020-11-09 DIAGNOSIS — I60.9 SAH (SUBARACHNOID HEMORRHAGE) (HCC): ICD-10-CM

## 2020-11-09 PROCEDURE — 36226 PLACE CATH VERTEBRAL ART: CPT

## 2020-11-09 PROCEDURE — 36227 PLACE CATH XTRNL CAROTID: CPT

## 2020-11-09 PROCEDURE — B31GYZZ FLUOROSCOPY OF BILATERAL VERTEBRAL ARTERIES USING OTHER CONTRAST: ICD-10-PCS | Performed by: RADIOLOGY

## 2020-11-09 PROCEDURE — 99153 MOD SED SAME PHYS/QHP EA: CPT

## 2020-11-09 PROCEDURE — 99152 MOD SED SAME PHYS/QHP 5/>YRS: CPT

## 2020-11-09 PROCEDURE — B318YZZ FLUOROSCOPY OF BILATERAL INTERNAL CAROTID ARTERIES USING OTHER CONTRAST: ICD-10-PCS | Performed by: RADIOLOGY

## 2020-11-09 PROCEDURE — B31CYZZ FLUOROSCOPY OF BILATERAL EXTERNAL CAROTID ARTERIES USING OTHER CONTRAST: ICD-10-PCS | Performed by: RADIOLOGY

## 2020-11-09 PROCEDURE — 36224 PLACE CATH CAROTD ART: CPT

## 2020-11-09 RX ORDER — MIDAZOLAM HYDROCHLORIDE 1 MG/ML
INJECTION INTRAMUSCULAR; INTRAVENOUS
Status: COMPLETED
Start: 2020-11-09 | End: 2020-11-09

## 2020-11-09 RX ORDER — HEPARIN SODIUM 5000 [USP'U]/ML
INJECTION, SOLUTION INTRAVENOUS; SUBCUTANEOUS
Status: COMPLETED
Start: 2020-11-09 | End: 2020-11-09

## 2020-11-09 RX ORDER — MORPHINE SULFATE 4 MG/ML
1 INJECTION, SOLUTION INTRAMUSCULAR; INTRAVENOUS EVERY 2 HOUR PRN
Status: DISCONTINUED | OUTPATIENT
Start: 2020-11-09 | End: 2020-11-09

## 2020-11-09 RX ORDER — VERAPAMIL HYDROCHLORIDE 2.5 MG/ML
INJECTION, SOLUTION INTRAVENOUS
Status: COMPLETED
Start: 2020-11-09 | End: 2020-11-09

## 2020-11-09 RX ORDER — ACETAMINOPHEN 650 MG/1
650 SUPPOSITORY RECTAL EVERY 4 HOURS PRN
Status: DISCONTINUED | OUTPATIENT
Start: 2020-11-09 | End: 2020-11-09

## 2020-11-09 RX ORDER — LIDOCAINE AND PRILOCAINE 25; 25 MG/G; MG/G
CREAM TOPICAL
Status: COMPLETED
Start: 2020-11-09 | End: 2020-11-09

## 2020-11-09 RX ORDER — ACETAMINOPHEN 325 MG/1
650 TABLET ORAL EVERY 4 HOURS PRN
Status: DISCONTINUED | OUTPATIENT
Start: 2020-11-09 | End: 2020-11-09

## 2020-11-09 RX ORDER — ONDANSETRON 2 MG/ML
4 INJECTION INTRAMUSCULAR; INTRAVENOUS EVERY 6 HOURS PRN
Status: DISCONTINUED | OUTPATIENT
Start: 2020-11-09 | End: 2020-11-09

## 2020-11-09 RX ORDER — LIDOCAINE HYDROCHLORIDE 10 MG/ML
INJECTION, SOLUTION INFILTRATION; PERINEURAL
Status: COMPLETED
Start: 2020-11-09 | End: 2020-11-09

## 2020-11-09 RX ORDER — NITROGLYCERIN 20 MG/100ML
INJECTION INTRAVENOUS
Status: COMPLETED
Start: 2020-11-09 | End: 2020-11-09

## 2020-11-09 RX ORDER — MORPHINE SULFATE 4 MG/ML
2 INJECTION, SOLUTION INTRAMUSCULAR; INTRAVENOUS EVERY 2 HOUR PRN
Status: DISCONTINUED | OUTPATIENT
Start: 2020-11-09 | End: 2020-11-09

## 2020-11-09 RX ORDER — METOCLOPRAMIDE HYDROCHLORIDE 5 MG/ML
10 INJECTION INTRAMUSCULAR; INTRAVENOUS EVERY 8 HOURS PRN
Status: DISCONTINUED | OUTPATIENT
Start: 2020-11-09 | End: 2020-11-09

## 2020-11-09 RX ADMIN — LIDOCAINE AND PRILOCAINE: 25; 25 CREAM TOPICAL at 07:30:00

## 2020-11-09 NOTE — PROGRESS NOTES
Patient had cerebral angio today with Dr. Melanie Canas. Right wrist access site with TR band in place with 10cc air instilled. Site is soft, CDI, no hematoma. Patient denies any numbness or tingling to right hand/fingers. VSS. Neuro intact.  is @ bedside.

## 2020-11-09 NOTE — H&P
History & Physical Examination    Patient Name: Glendy Medellin  MRN: [id number]  [facility]: [id number]  YOB: 1980    Diagnosis:  Subarachnoid Hemorrhage    Present Illness:  Glendy Medellin is a(n) 47year old female with a history of 4 pregnancies, PHOSPHATASE      37 - 98 U/L 108 (H)   Total Bilirubin      0.1 - 2.0 mg/dL 0.6   TOTAL PROTEIN      6.4 - 8.2 g/dL 7.1   Albumin      3.4 - 5.0 g/dL 3.3 (L)   Globulin      2.8 - 4.4 g/dL 3.8   A/G Ratio      1.0 - 2.0 0.9 (L)     Component      Latest Re [X]    HEART [X] [X]    LUNGS [X] [X]    ABDOMEN [X] [X]    UROGENITAL [ ] [ ] Deferred   EXTREMITIES [facility].Breana ] [ Duane Gardener        [ x ] I have discussed the risks and benefits and alternatives with the patient/family.   They understand and agree to proceed wi

## 2020-11-09 NOTE — PROCEDURES
[facility]  Neurointerventional Surgery Operative Report  Maritza Massey Patient Status:  Outpatient in a Bed    1980 MRN [id number]   Location [address] [facility] Attending Darinel Borrero MD   Hosp Day # 0 PCP None Pcp

## 2020-11-09 NOTE — PROCEDURES
[facility]  Pre-Procedural Note  Robina Peñaloza Patient Status:  Outpatient in a Bed    1980 MRN [id number]   Location [address] [facility] Attending Tiffany Diane MD   Hosp Day # 0 PCP None Pcp     Procedure: Cerebral angio

## 2020-11-10 ENCOUNTER — TELEPHONE (OUTPATIENT)
Dept: SURGERY | Facility: CLINIC | Age: 40
End: 2020-11-10

## 2020-11-10 NOTE — TELEPHONE ENCOUNTER
Hi. Patient had follow up angio for spontaneous SAH. Angio was negative    PLEASE place reminder for follow up non contrast MRA Brain in 1 year (November 2021)    She will need follow up at that time with Dr. Cliff Olsen.       Thanks

## 2020-11-16 ENCOUNTER — TELEPHONE (OUTPATIENT)
Dept: PHYSICAL THERAPY | Age: 40
End: 2020-11-16

## 2020-11-17 ENCOUNTER — OFFICE VISIT (OUTPATIENT)
Dept: PHYSICAL THERAPY | Age: 40
End: 2020-11-17
Attending: INTERNAL MEDICINE
Payer: COMMERCIAL

## 2020-11-17 DIAGNOSIS — I60.9 SAH (SUBARACHNOID HEMORRHAGE) (HCC): ICD-10-CM

## 2020-11-17 DIAGNOSIS — I63.89: ICD-10-CM

## 2020-11-17 PROCEDURE — 97110 THERAPEUTIC EXERCISES: CPT

## 2020-11-17 PROCEDURE — 97161 PT EVAL LOW COMPLEX 20 MIN: CPT

## 2020-11-17 NOTE — PROGRESS NOTES
SPINE EVALUATION:   Referring Physician: Dr. Taylor Jenkins  Diagnosis:  SAH (subarachnoid hemorrhage) ([facility] (I60.9)  Cerebral venous infarction, acute ([facility] (I63.89)    tension headaches and L trapezius tension Date of Service: 11/17/2020     PATIENT SUMMARY   A neck pain and headache. The results of the objective tests and measures show impaired posture, ROM, joint mobility,muscle performance and motor function .   Functional deficits include but are not limited to difficulty breastfeeding, carrying her  (1 prognosis. Pt was also provided recommendations for possible soreness after evaluation, postural corrections and importance of remaining active  Patient was instructed in and issued a HEP.  See Pt instructions    Charges: PT Eval Low Complexity, Ex 1      T the need for these services furnished under this plan of treatment and while under my care.     X___________________________________________________ Date____________________    Certification From: 95/02/6498  To:2/15/2021

## 2020-11-19 ENCOUNTER — OFFICE VISIT (OUTPATIENT)
Dept: PHYSICAL THERAPY | Age: 40
End: 2020-11-19
Attending: INTERNAL MEDICINE
Payer: COMMERCIAL

## 2020-11-19 PROCEDURE — 97110 THERAPEUTIC EXERCISES: CPT

## 2020-11-19 PROCEDURE — 97140 MANUAL THERAPY 1/> REGIONS: CPT

## 2020-11-19 NOTE — PROGRESS NOTES
Dx: SAH (subarachnoid hemorrhage) ([facility]) (I60.9)  Cerebral venous infarction, acute ([facility]) (I63.89)   tension headaches and L trapezius tension         Insurance (Authorized # of Visits):  PPO 10 visits recommeded             Authorizing Physician: Dr. Alda Jon

## 2020-11-23 ENCOUNTER — OFFICE VISIT (OUTPATIENT)
Dept: PHYSICAL THERAPY | Age: 40
End: 2020-11-23
Attending: INTERNAL MEDICINE
Payer: COMMERCIAL

## 2020-11-23 ENCOUNTER — TELEPHONE (OUTPATIENT)
Dept: SURGERY | Facility: CLINIC | Age: 40
End: 2020-11-23

## 2020-11-23 ENCOUNTER — OFFICE VISIT (OUTPATIENT)
Dept: SURGERY | Facility: CLINIC | Age: 40
End: 2020-11-23
Payer: COMMERCIAL

## 2020-11-23 VITALS
DIASTOLIC BLOOD PRESSURE: 78 MMHG | BODY MASS INDEX: 25.76 KG/M2 | HEIGHT: 68 IN | WEIGHT: 170 LBS | SYSTOLIC BLOOD PRESSURE: 118 MMHG | HEART RATE: 76 BPM

## 2020-11-23 DIAGNOSIS — I60.7: Primary | ICD-10-CM

## 2020-11-23 PROCEDURE — 3074F SYST BP LT 130 MM HG: CPT | Performed by: NURSE PRACTITIONER

## 2020-11-23 PROCEDURE — 97110 THERAPEUTIC EXERCISES: CPT

## 2020-11-23 PROCEDURE — 3008F BODY MASS INDEX DOCD: CPT | Performed by: NURSE PRACTITIONER

## 2020-11-23 PROCEDURE — 97140 MANUAL THERAPY 1/> REGIONS: CPT

## 2020-11-23 PROCEDURE — 99212 OFFICE O/P EST SF 10 MIN: CPT | Performed by: NURSE PRACTITIONER

## 2020-11-23 PROCEDURE — 3078F DIAST BP <80 MM HG: CPT | Performed by: NURSE PRACTITIONER

## 2020-11-23 NOTE — PROGRESS NOTES
Patient here for 2-week wrist incision check post cerebral angiogram 11/09. States incision site has been healing well. No abnormal discharge or swelling.         Review of Systems:    Hand Dominance: right  General: no symptoms reported  Neuro: no symptoms

## 2020-11-23 NOTE — PROGRESS NOTES
Dx: SAH (subarachnoid hemorrhage) ([facility]) (I60.9)  Cerebral venous infarction, acute ([facility]) (I63.89)   tension headaches and L trapezius tension         Insurance (Authorized # of Visits):  PPO 10 visits recommeded             Authorizing Physician: Dr. Pricila Chavez re stretches x15 min Supine: manual cervical distraction, UT stretches x15 min      HEP: 11/19/2020   Continue with HEP  11/23/2020  Median nerve sliders, foam roll posture super 4     Charges: Ex 2 MT 1       Total Timed Treatment: 45 min  Total Treatment Ti

## 2020-11-23 NOTE — TELEPHONE ENCOUNTER
Hi patient was seen for nontraumatic SAH s/p negative angio x 2    She will need non contrast MRA brain imaging in 1 year    Please place reminder.   Thanks

## 2020-11-23 NOTE — PROGRESS NOTES
[facility]  Interventional Neuroradiology Progress Note    Dulce Maria Edmonds     1980 MRN [id number]   Location [address] [place] [address], [address] [place] [address], [place] RADHA [place]     Subjective:   We had the pleasure steffany Reinoson) 03 Motor:  no focal arm or leg weakness  Sensory: Intact to light touch  Gait: normal casual gait, no loss of balance obsered    Wrist: right wrist puncture site visible, small scab at access site, No ecchymosis, drainage, erythema, or tenderness.   Radial a

## 2020-12-01 ENCOUNTER — OFFICE VISIT (OUTPATIENT)
Dept: PHYSICAL THERAPY | Age: 40
End: 2020-12-01
Attending: INTERNAL MEDICINE
Payer: COMMERCIAL

## 2020-12-01 PROCEDURE — 97110 THERAPEUTIC EXERCISES: CPT

## 2020-12-01 PROCEDURE — 97012 MECHANICAL TRACTION THERAPY: CPT

## 2020-12-01 NOTE — PROGRESS NOTES
Dx: SAH (subarachnoid hemorrhage) ([facility]) (I60.9)  Cerebral venous infarction, acute ([facility]) (I63.89)   tension headaches and L trapezius tension         Insurance (Authorized # of Visits):  PPO 10 visits recommeded             Authorizing Physician: Dr. Michelle Barker re with arm slides x10       Prone thoracic extension mobilization Gr V       Prone scapular rows and shd ext 2# x10 ea  Prone H abd with ER (no weight) x10 ea     Self thoracic ext mobilization on FR 2x10 Self thoracic ext mobilization on FR 2x10 Self thorac

## 2020-12-03 ENCOUNTER — OFFICE VISIT (OUTPATIENT)
Dept: PHYSICAL THERAPY | Age: 40
End: 2020-12-03
Attending: INTERNAL MEDICINE
Payer: COMMERCIAL

## 2020-12-03 PROCEDURE — 97140 MANUAL THERAPY 1/> REGIONS: CPT

## 2020-12-03 PROCEDURE — 97110 THERAPEUTIC EXERCISES: CPT

## 2020-12-03 PROCEDURE — 97012 MECHANICAL TRACTION THERAPY: CPT

## 2020-12-03 NOTE — PROGRESS NOTES
Dx: SAH (subarachnoid hemorrhage) ([facility]) (I60.9)  Cerebral venous infarction, acute ([facility]) (I63.89)   tension headaches and L trapezius tension         Insurance (Authorized # of Visits):  PPO 10 visits recommeded             Authorizing Physician: Dr. Michelle Barker re correction with arm slides x10 Back to wall: posture correction with arm slides x10 Back to wall: posture correction with arm slides x10      Prone thoracic extension mobilization Gr V Prone thoracic extension mobilization Gr V  C7, T1-3 L/R lateral mobili

## 2020-12-08 ENCOUNTER — OFFICE VISIT (OUTPATIENT)
Dept: PHYSICAL THERAPY | Age: 40
End: 2020-12-08
Attending: INTERNAL MEDICINE
Payer: COMMERCIAL

## 2020-12-08 PROCEDURE — 97110 THERAPEUTIC EXERCISES: CPT

## 2020-12-08 PROCEDURE — 97140 MANUAL THERAPY 1/> REGIONS: CPT

## 2020-12-08 NOTE — PROGRESS NOTES
Dx: SAH (subarachnoid hemorrhage) ([facility]) (I60.9)  Cerebral venous infarction, acute ([facility]) (I63.89)   tension headaches and L trapezius tension         Insurance (Authorized # of Visits):  PPO 10 visits recommeded             Authorizing Physician: Dr. Clay plummer 6/10   UBE retro x6 min UBE retro x6 min UBE retro x6 min  UBE retro x6 min lumbar screen:  Standing ext x10  Hkly:  TrA 10x10 sec  Hkly: LTR x10  Supine 90/90 sciatic slider x10     Doorway W stretch 3x10 sec Doorway W stretch 3x10 sec Corner pectoral stre sec x15 min --   HEP: 11/19/2020   Continue with HEP  11/23/2020  Median nerve sliders, foam roll posture super 4   12/1/2020 hawa pose central and L/R    Charges: Ex 1  MT 2    Total Timed Treatment: 45 min  Total Treatment Time: 45 min

## 2020-12-10 ENCOUNTER — OFFICE VISIT (OUTPATIENT)
Dept: PHYSICAL THERAPY | Age: 40
End: 2020-12-10
Attending: INTERNAL MEDICINE
Payer: COMMERCIAL

## 2020-12-10 PROCEDURE — 97140 MANUAL THERAPY 1/> REGIONS: CPT

## 2020-12-10 PROCEDURE — 97110 THERAPEUTIC EXERCISES: CPT

## 2020-12-10 NOTE — PROGRESS NOTES
Dx: SAH (subarachnoid hemorrhage) ([facility]) (I60.9)  Cerebral venous infarction, acute ([facility]) (I63.89)   tension headaches and L trapezius tension         Insurance (Authorized # of Visits):  PPO 10 visits recommeded             Authorizing Physician: Dr. Brooke Boss re stretch 3x10 sec Doorway W stretch 3x10 sec Corner pectoral stretch 3x10 sec Corner pectoral stretch 3x10 sec -- --   Wall press ups x10 Wall press ups with plus x10 Wall press ups with plus x15 Wall press ups with plus x15 -- --   Back to wall: posture co min -- --   HEP: 11/19/2020   Continue with HEP  11/23/2020  Median nerve sliders, foam roll posture super 4   12/1/2020 hawa pose central and L/R  12/10/2020  Slump sliders, gastroc stretches and deep breathing    Charges: Ex 1  MT 2    Total Timed Julia

## 2020-12-13 ENCOUNTER — APPOINTMENT (OUTPATIENT)
Dept: MRI IMAGING | Facility: HOSPITAL | Age: 40
End: 2020-12-13
Attending: EMERGENCY MEDICINE
Payer: COMMERCIAL

## 2020-12-13 ENCOUNTER — HOSPITAL ENCOUNTER (EMERGENCY)
Facility: HOSPITAL | Age: 40
Discharge: HOME | End: 2020-12-13
Attending: EMERGENCY MEDICINE
Payer: COMMERCIAL

## 2020-12-13 ENCOUNTER — APPOINTMENT (OUTPATIENT)
Dept: CT IMAGING | Facility: HOSPITAL | Age: 40
End: 2020-12-13
Attending: EMERGENCY MEDICINE
Payer: COMMERCIAL

## 2020-12-13 VITALS
HEART RATE: 81 BPM | DIASTOLIC BLOOD PRESSURE: 84 MMHG | WEIGHT: 165 LBS | SYSTOLIC BLOOD PRESSURE: 114 MMHG | BODY MASS INDEX: 25.01 KG/M2 | TEMPERATURE: 99 F | RESPIRATION RATE: 17 BRPM | HEIGHT: 68 IN | OXYGEN SATURATION: 99 %

## 2020-12-13 DIAGNOSIS — M54.40 BACK PAIN OF LUMBAR REGION WITH SCIATICA: Primary | ICD-10-CM

## 2020-12-13 DIAGNOSIS — M51.26 HERNIATED LUMBAR INTERVERTEBRAL DISC: ICD-10-CM

## 2020-12-13 PROCEDURE — 81003 URINALYSIS AUTO W/O SCOPE: CPT | Performed by: EMERGENCY MEDICINE

## 2020-12-13 PROCEDURE — 70450 CT HEAD/BRAIN W/O DYE: CPT | Performed by: EMERGENCY MEDICINE

## 2020-12-13 PROCEDURE — 99285 EMERGENCY DEPT VISIT HI MDM: CPT

## 2020-12-13 PROCEDURE — 99284 EMERGENCY DEPT VISIT MOD MDM: CPT

## 2020-12-13 PROCEDURE — 80053 COMPREHEN METABOLIC PANEL: CPT | Performed by: EMERGENCY MEDICINE

## 2020-12-13 PROCEDURE — 96374 THER/PROPH/DIAG INJ IV PUSH: CPT

## 2020-12-13 PROCEDURE — 85025 COMPLETE CBC W/AUTO DIFF WBC: CPT | Performed by: EMERGENCY MEDICINE

## 2020-12-13 PROCEDURE — 72148 MRI LUMBAR SPINE W/O DYE: CPT | Performed by: EMERGENCY MEDICINE

## 2020-12-13 PROCEDURE — 81025 URINE PREGNANCY TEST: CPT

## 2020-12-13 RX ORDER — HYDROCODONE BITARTRATE AND ACETAMINOPHEN 5; 325 MG/1; MG/1
1-2 TABLET ORAL EVERY 6 HOURS PRN
Qty: 10 TABLET | Refills: 0 | Status: SHIPPED | OUTPATIENT
Start: 2020-12-13 | End: 2020-12-20

## 2020-12-13 RX ORDER — PREDNISONE 20 MG/1
40 TABLET ORAL DAILY
Qty: 10 TABLET | Refills: 0 | Status: SHIPPED | OUTPATIENT
Start: 2020-12-13 | End: 2020-12-18

## 2020-12-13 RX ORDER — LORAZEPAM 2 MG/ML
0.5 INJECTION INTRAMUSCULAR ONCE
Status: COMPLETED | OUTPATIENT
Start: 2020-12-13 | End: 2020-12-13

## 2020-12-14 NOTE — ED NOTES
Patient having a hard time explaining sensation of body. Patient expresses numbness and tingling to left leg, sometimes pressure. Patient feels a \"warm sensation\" almost questioning if she will lose bladder control.  Patient states sensation \"fires up my

## 2020-12-14 NOTE — ED INITIAL ASSESSMENT (HPI)
Patient 9 weeks postpartum. Patient having increased sciatica pain that started a week ago Saturday. Wednesday started to get worse. Now sometimes having increased numbness, tingling. Patient states \"warmness, numbness up my spine. \" Patient states the se

## 2020-12-14 NOTE — ED PROVIDER NOTES
Patient Seen in: [facility] Emergency Department      History   Patient presents with:  Numbness Weakness    Stated Complaint: warm sensations going down bilateral legs, some numbness, sciatic back pain, 9 *    HPI    42-year-old female presents adebayo LMP 01/26/2020   SpO2 99%   BMI 25.09 kg/m²         Physical Exam    All measures to prevent infection transmission during my interaction with the patient were taken. The patient was already wearing a droplet mask on my arrival to the room.  Personal protec ------                     CBC W/ DIFFERENTIAL[[id number]]                              Final result                 Please view results for these tests on the individual orders.    URINALYSIS WITH CULTURE REFLEX   POCT PREGNANCY, URINE   CBC W/ DIFFERENTIAL visualized paranasal sinuses are clear. MASTOIDS:  The mastoids are clear. SKULL:  Stable. CONCLUSION:  No acute intracranial hemorrhage.    Dictated by (CST): Brenna Wang MD on 12/13/2020 at 10:26 PM     Finalized by (CST): Brenna Wang MD on 1 CONCLUSION:  1. At the lumbar sacral level is a left paracentral and lateral disc protrusion which causes mass effect upon the left L5 and S1 nerve roots with left neural foraminal narrowing as detailed above.    Dictated by (CST): John Calabrese MD on 12/1

## 2020-12-15 ENCOUNTER — OFFICE VISIT (OUTPATIENT)
Dept: PHYSICAL THERAPY | Age: 40
End: 2020-12-15
Attending: INTERNAL MEDICINE
Payer: COMMERCIAL

## 2020-12-15 PROCEDURE — 97140 MANUAL THERAPY 1/> REGIONS: CPT

## 2020-12-15 PROCEDURE — 97110 THERAPEUTIC EXERCISES: CPT

## 2020-12-15 NOTE — PROGRESS NOTES
Dx: SAH (subarachnoid hemorrhage) ([facility]) (I60.9)  Cerebral venous infarction, acute ([facility]) (I63.89)   tension headaches and L trapezius tension         Insurance (Authorized # of Visits):  PPO 10 visits recommeded             Authorizing Physician: Dr. Rossana Rodríguez stretch 3x10 sec -- -- UBE x6 min retro   Wall press ups with plus x15 Wall press ups with plus x15 -- -- Manual cervical distraction and PROM x10 min   Prone thoracic extension mobilization Gr V  C7, T1-3 L/R lateral mobilizations   Back to wall: posture

## 2020-12-17 ENCOUNTER — OFFICE VISIT (OUTPATIENT)
Dept: PHYSICAL THERAPY | Age: 40
End: 2020-12-17
Attending: INTERNAL MEDICINE
Payer: COMMERCIAL

## 2020-12-17 PROCEDURE — 97110 THERAPEUTIC EXERCISES: CPT

## 2020-12-17 PROCEDURE — 97140 MANUAL THERAPY 1/> REGIONS: CPT

## 2020-12-17 NOTE — PROGRESS NOTES
Dx: SAH (subarachnoid hemorrhage) ([facility]) (I60.9)  Cerebral venous infarction, acute ([facility]) (I63.89)   tension headaches and L trapezius tension         Insurance (Authorized # of Visits):  PPO 10 visits recommeded             Authorizing Physician: Dr. Isamar Rabago weight) x10 ea Prone scapular rows and shd ext 2# x15 ea  Prone H abd with ER (no weight) x15 ea   Cupping L/R upper trap x10 min Cupping L/R upper trap,thoracic paraspinals x10 min -- --    -- --    -- -- TRX: low rows, high rows 2x10 ea TRX: low rows, hi

## 2020-12-21 ENCOUNTER — OFFICE VISIT (OUTPATIENT)
Dept: PHYSICAL THERAPY | Age: 40
End: 2020-12-21
Attending: INTERNAL MEDICINE
Payer: COMMERCIAL

## 2020-12-21 PROCEDURE — 97110 THERAPEUTIC EXERCISES: CPT

## 2020-12-21 PROCEDURE — 97140 MANUAL THERAPY 1/> REGIONS: CPT

## 2020-12-21 NOTE — PROGRESS NOTES
Dx: SAH (subarachnoid hemorrhage) ([facility]) (I60.9)  Cerebral venous infarction, acute ([facility]) (I63.89)   tension headaches and L trapezius tension         Insurance (Authorized # of Visits):  PPO 10 visits recommeded             Authorizing Physician: Dr. Jolie plummer and for this course of care. Thank you for your referral. If you have any questions, please contact me at Dept: [phone].     Sincerely,  Electronically signed by therapist: Tracey Johnson PT    [de-identified] certification required: No      Date: 12/8/ -- -- --     -- -- --     -- -- --  Re-assess   HEP: 11/19/2020   Continue with HEP  11/23/2020  Median nerve sliders, foam roll posture super 4   12/1/2020 hawa pose central and L/R  12/10/2020  Slump sliders, gastroc stretches and deep breathing    C

## 2021-02-12 ENCOUNTER — TELEPHONE (OUTPATIENT)
Dept: SURGERY | Facility: CLINIC | Age: 41
End: 2021-02-12

## 2021-02-12 NOTE — TELEPHONE ENCOUNTER
Received [facility] procedure record from patient's cerebral angiogram procedure on 10-26-20 with Dr New Lopez. Patient had follow up appointment with Kaila Alexander on 11-23-20. Plan to follow up in 1 year. Apt scheduled?   No    .  Copy of record made and sent to

## 2021-02-16 ENCOUNTER — TELEPHONE (OUTPATIENT)
Dept: SURGERY | Facility: CLINIC | Age: 41
End: 2021-02-16

## 2021-02-16 NOTE — TELEPHONE ENCOUNTER
Received [facility] procedure record from patient's cerebral angiogram procedure on 11-9-20 with Dr Kim Willams. Patient had follow up appointment with Lenny Oseguera on 11-23-20. Plan for non contrast MRA brain in 1 year with return to clinic of tele visit (Nov 2021).

## 2021-08-02 NOTE — LETTER
Patient Name: Parisa Cuenca        : 1980       Medical Record #: [id number]    CONSENT FOR PROCEDURES/SEDATION    Date: 10/26/2020       Time: 2:57 AM        1.  I authorize the performance upon Parisa Cuenca the following:    Cerebral Angiogra Patient notified of recent stress test.

## 2021-11-02 ENCOUNTER — TELEPHONE (OUTPATIENT)
Dept: SURGERY | Facility: CLINIC | Age: 41
End: 2021-11-02

## 2021-11-02 DIAGNOSIS — I60.7: Primary | ICD-10-CM

## 2021-11-02 NOTE — TELEPHONE ENCOUNTER
Per pt reminder \"She will need non contrast MRA brain imaging in 1 year\"    Order needed. Pended to provider.

## 2023-11-01 ENCOUNTER — PATIENT MESSAGE (OUTPATIENT)
Dept: OBGYN CLINIC | Facility: CLINIC | Age: 43
End: 2023-11-01

## 2023-11-01 DIAGNOSIS — N83.202 CYST OF LEFT OVARY: Primary | ICD-10-CM

## 2023-11-02 ENCOUNTER — OFFICE VISIT (OUTPATIENT)
Dept: OBGYN CLINIC | Facility: CLINIC | Age: 43
End: 2023-11-02
Payer: COMMERCIAL

## 2023-11-02 VITALS
BODY MASS INDEX: 26.67 KG/M2 | WEIGHT: 176 LBS | HEIGHT: 68 IN | DIASTOLIC BLOOD PRESSURE: 81 MMHG | SYSTOLIC BLOOD PRESSURE: 120 MMHG

## 2023-11-02 DIAGNOSIS — F41.9 ANXIETY: ICD-10-CM

## 2023-11-02 DIAGNOSIS — R10.32 LLQ PAIN: ICD-10-CM

## 2023-11-02 DIAGNOSIS — Z00.00 ENCOUNTER FOR WELL WOMAN EXAM WITHOUT GYNECOLOGICAL EXAM: Primary | ICD-10-CM

## 2023-11-02 DIAGNOSIS — Z12.31 SCREENING MAMMOGRAM FOR BREAST CANCER: ICD-10-CM

## 2023-11-02 PROCEDURE — 3079F DIAST BP 80-89 MM HG: CPT | Performed by: OBSTETRICS & GYNECOLOGY

## 2023-11-02 PROCEDURE — 99396 PREV VISIT EST AGE 40-64: CPT | Performed by: OBSTETRICS & GYNECOLOGY

## 2023-11-02 PROCEDURE — 3008F BODY MASS INDEX DOCD: CPT | Performed by: OBSTETRICS & GYNECOLOGY

## 2023-11-02 PROCEDURE — 3074F SYST BP LT 130 MM HG: CPT | Performed by: OBSTETRICS & GYNECOLOGY

## 2023-11-02 RX ORDER — ESCITALOPRAM OXALATE 10 MG/1
10 TABLET ORAL DAILY
Qty: 30 TABLET | Refills: 11 | Status: SHIPPED | OUTPATIENT
Start: 2023-11-02

## 2023-11-02 RX ORDER — ALPRAZOLAM 0.5 MG/1
0.5 TABLET ORAL 3 TIMES DAILY PRN
Qty: 20 TABLET | Refills: 0 | Status: SHIPPED | OUTPATIENT
Start: 2023-11-02

## 2023-11-02 RX ORDER — CEPHALEXIN 500 MG/1
500 CAPSULE ORAL 2 TIMES DAILY
COMMUNITY
Start: 2023-10-27

## 2023-11-14 ENCOUNTER — HOSPITAL ENCOUNTER (OUTPATIENT)
Dept: ULTRASOUND IMAGING | Age: 43
Discharge: HOME | End: 2023-11-14
Attending: OBSTETRICS & GYNECOLOGY
Payer: COMMERCIAL

## 2023-11-14 DIAGNOSIS — R10.32 LLQ PAIN: ICD-10-CM

## 2023-11-14 PROCEDURE — 76856 US EXAM PELVIC COMPLETE: CPT | Performed by: OBSTETRICS & GYNECOLOGY

## 2023-11-14 PROCEDURE — 76830 TRANSVAGINAL US NON-OB: CPT | Performed by: OBSTETRICS & GYNECOLOGY

## 2023-11-22 NOTE — TELEPHONE ENCOUNTER
Called patient, no answer voicemail left to inform of US results reviewed by Micah Kendrick. informed repeat in 6 weeks to be completed to call central scheduling to make apt. Phone number provided advised to call office with any questions.

## 2024-08-14 ENCOUNTER — PATIENT MESSAGE (OUTPATIENT)
Dept: OBGYN CLINIC | Facility: CLINIC | Age: 44
End: 2024-08-14

## 2024-08-14 DIAGNOSIS — R53.83 FATIGUE, UNSPECIFIED TYPE: Primary | ICD-10-CM

## 2024-08-14 DIAGNOSIS — R63.5 WEIGHT GAIN: ICD-10-CM

## 2024-08-21 ENCOUNTER — HOSPITAL ENCOUNTER (OUTPATIENT)
Dept: MAMMOGRAPHY | Age: 44
Discharge: HOME | End: 2024-08-21
Attending: OBSTETRICS & GYNECOLOGY
Payer: COMMERCIAL

## 2024-08-21 DIAGNOSIS — Z12.31 SCREENING MAMMOGRAM FOR BREAST CANCER: ICD-10-CM

## 2024-08-21 PROCEDURE — 77067 SCR MAMMO BI INCL CAD: CPT | Performed by: OBSTETRICS & GYNECOLOGY

## 2024-08-21 PROCEDURE — 77063 BREAST TOMOSYNTHESIS BI: CPT | Performed by: OBSTETRICS & GYNECOLOGY

## 2024-08-23 NOTE — TELEPHONE ENCOUNTER
From: Cayla Guevara  To: Rosalind Porter  Sent: 8/14/2024 6:53 PM CDT  Subject: Escitalopram side-effects     Hi, Dr. Porter!  I hope you’re doing well and enjoying summer :) I’m reaching out with a non-urgent matter regarding side effects of my anxiety medication. I have noticed a pretty steady weight gain over the last 10 months, which I understand is a side affect, but I’ve probably gained 20 pounds since I last saw you without any significant changes other than the medication. I wanted to bring it to your attention incase that seems out of the norm.     I also have experienced a fairly significant decrease in libido, which is frustrating at times.    I wanted to put this on your radar, as I find the benefits of helping to keep my anxiety and stress levels in-check to be really great; however, I’m struggling to manage my weight and feel any sort of interest in being intimate, which have never been issues for me.     If you have any thoughts or recommendations regarding these concerns, I’m all ears! Thanks :)   Cayla Guevara

## 2024-08-23 NOTE — TELEPHONE ENCOUNTER
Spoke with patient,     Order tsh, vit B12, ferritin  and Vit D   Dx fatigue and weight gain.     Rosalind Porter MD

## 2024-11-11 RX ORDER — ESCITALOPRAM OXALATE 10 MG/1
10 TABLET ORAL DAILY
Qty: 90 TABLET | Refills: 4 | Status: SHIPPED | OUTPATIENT
Start: 2024-11-11

## 2024-11-12 ENCOUNTER — TELEPHONE (OUTPATIENT)
Dept: OBGYN CLINIC | Facility: CLINIC | Age: 44
End: 2024-11-12

## 2025-05-12 RX ORDER — ESCITALOPRAM OXALATE 10 MG/1
10 TABLET ORAL DAILY
Qty: 90 TABLET | Refills: 4 | OUTPATIENT
Start: 2025-05-12

## (undated) NOTE — LETTER
[facility] [address], [address]  Consent for Procedure/Sedation    Date:     Time:       1.  I authorize the performance upon Aretta Degree the following:  CEREBRAL ANGIOGRAM     2. I authorize Dr. Belen Aguilar (and whomever is desig Printed: 2020   9:50 AM  Patient Name: Maritza Massey        : 1980       Medical Record #: [id number]

## (undated) NOTE — LETTER
Patient Name: Glenny Barrientos  YOB: 1980          MRN :  [id number]  Date:  12/21/2020  Referring Physician:  No ref. provider found    Discharge Summary  Pt has attended 10 visits in Physical Therapy.  Luis M Barclay reports feeling 95% improvement in n Physician's certification required: No

## (undated) NOTE — LETTER
[facility] [address], [address]  Consent for Procedure/Sedation    Date: 10/26/2020    Time: 0730      1. I authorize the performance upon Stephanie Daily the following: Cerebral angiogram and possible closure device.     2. I au to consent for patient: ___________________________   patient: ___________________    Witness: ______________________________________  Date: _____________________    Patient Name: Lonnie Patton     : 1980                 Printed: 2020

## (undated) NOTE — LETTER
Patient Name: Kiki Mota  YOB: 1980          MRN number:  [id number]  Date:  11/17/2020  Referring Physician:  Latonya Odell         SPINE EVALUATION:    Referring Physician: Dr. Rosy Herrera  Diagnosis:  [facility]ach[facility] (I60 Pt denies diplopia, dysarthria, dysphasia, dizziness, drop attacks, and CINDI LE N/T.    Santi Moreno presents to physical therapy evaluation with primary c/o neck pain and headache.  The results of the objective tests and measures show impaired posture Cervical Distraction: improves  ULTT: median: neg      Today’s Treatment and Response:   Pt education was provided on exam findings, treatment diagnosis, treatment plan, expectations, and prognosis.  Pt was also provided recommendations for possible sorenes Thank you for your referral. Please co-sign or sign and return this letter via fax as soon as possible to [phone].  If you have any questions, please contact me at Dept: [phone]    Sincerely,  Electronically signed by therapist: Tato Correia PT

## (undated) NOTE — LETTER
Patient Name: Graham Lees        : 1980       Medical Record #: [id number]    CONSENT FOR PROCEDURES/SEDATION    Date: 10/26/2020       Time: 2:55 AM        1.  I authorize the performance upon Graham Lees the following:    __________Cerebra

## (undated) NOTE — LETTER
Pre-Procedure  Nathalie Tong Levels       I acknowledge that I have been informed of the risk involved by refusing the URINE PREGNANCY TEST on [facility].     I, thereby, release [address]

## (undated) NOTE — LETTER
Patient Name: Graham Lees  YOB: 1980          MRN :  [id number]  Date:  12/8/2020  Referring Physician:  No ref.  provider found

## (undated) NOTE — LETTER
[facility] [address], [address]  Consent for Procedure/Sedation    Date: 10/26/2020   Time: 0730      1. I authorize the performance upon Yassine Hernandez the following: Cerebral angiogram     2.  I authorize  (and whom to consent for patient: ___________________________   patient: ___________________    Witness: ______________________________________  Date: _____________________    Patient Name: Karthik Walker     : 1980                 Printed: 2020

## (undated) NOTE — LETTER
[facility] [address], [address]  Consent for Procedure/Sedation    Date: ***    Time: ***      1. I authorize the performance upon Ahmad Bosworth the following: Cerebral angiogram, possible thrombectomy, possible angioplasty, p Signature of person authorized                                           Relationship to  to consent for patient: ___________________________   patient: ___________________    Witness: ______________________________________  Date: _____________________